# Patient Record
Sex: FEMALE | Race: WHITE | NOT HISPANIC OR LATINO | Employment: FULL TIME | URBAN - METROPOLITAN AREA
[De-identification: names, ages, dates, MRNs, and addresses within clinical notes are randomized per-mention and may not be internally consistent; named-entity substitution may affect disease eponyms.]

---

## 2023-12-16 ENCOUNTER — HOSPITAL ENCOUNTER (EMERGENCY)
Facility: HOSPITAL | Age: 33
Discharge: HOME | End: 2023-12-16
Attending: EMERGENCY MEDICINE

## 2023-12-16 VITALS
TEMPERATURE: 98 F | BODY MASS INDEX: 31.39 KG/M2 | HEIGHT: 67 IN | RESPIRATION RATE: 18 BRPM | DIASTOLIC BLOOD PRESSURE: 103 MMHG | HEART RATE: 100 BPM | OXYGEN SATURATION: 99 % | WEIGHT: 200 LBS | SYSTOLIC BLOOD PRESSURE: 130 MMHG

## 2023-12-16 DIAGNOSIS — S62.614A DISPLACED FRACTURE OF PROXIMAL PHALANX OF RIGHT RING FINGER, INITIAL ENCOUNTER FOR CLOSED FRACTURE: Primary | ICD-10-CM

## 2023-12-16 PROCEDURE — 73140 XR FINGER 2 OR MORE VIEWS RIGHT: ICD-10-PCS | Mod: 26,RT,, | Performed by: RADIOLOGY

## 2023-12-16 PROCEDURE — 99284 EMERGENCY DEPT VISIT MOD MDM: CPT

## 2023-12-16 PROCEDURE — 63600175 PHARM REV CODE 636 W HCPCS: Performed by: NURSE PRACTITIONER

## 2023-12-16 PROCEDURE — 96372 THER/PROPH/DIAG INJ SC/IM: CPT | Performed by: NURSE PRACTITIONER

## 2023-12-16 PROCEDURE — 26742 TREAT FINGER FRACTURE EACH: CPT | Mod: F8

## 2023-12-16 PROCEDURE — 73140 X-RAY EXAM OF FINGER(S): CPT | Mod: TC,RT

## 2023-12-16 PROCEDURE — 73140 X-RAY EXAM OF FINGER(S): CPT | Mod: 26,RT,, | Performed by: RADIOLOGY

## 2023-12-16 PROCEDURE — 25000003 PHARM REV CODE 250: Performed by: NURSE PRACTITIONER

## 2023-12-16 RX ORDER — KETOROLAC TROMETHAMINE 30 MG/ML
60 INJECTION, SOLUTION INTRAMUSCULAR; INTRAVENOUS
Status: COMPLETED | OUTPATIENT
Start: 2023-12-16 | End: 2023-12-16

## 2023-12-16 RX ORDER — IBUPROFEN 800 MG/1
800 TABLET ORAL EVERY 6 HOURS PRN
Qty: 30 TABLET | Refills: 0 | Status: SHIPPED | OUTPATIENT
Start: 2023-12-16

## 2023-12-16 RX ORDER — LIDOCAINE HYDROCHLORIDE 10 MG/ML
5 INJECTION, SOLUTION EPIDURAL; INFILTRATION; INTRACAUDAL; PERINEURAL
Status: COMPLETED | OUTPATIENT
Start: 2023-12-16 | End: 2023-12-16

## 2023-12-16 RX ADMIN — LIDOCAINE HYDROCHLORIDE 80 MG: 10 INJECTION, SOLUTION EPIDURAL; INFILTRATION; INTRACAUDAL; PERINEURAL at 10:12

## 2023-12-16 RX ADMIN — KETOROLAC TROMETHAMINE 60 MG: 30 INJECTION, SOLUTION INTRAMUSCULAR; INTRAVENOUS at 12:12

## 2023-12-16 NOTE — ED NOTES
Ring removed off 4th distal finger via [employer] medical scissors. Pt tolerated well. Awaiting Xray.

## 2023-12-16 NOTE — ED PROVIDER NOTES
"CHIEF COMPLAINT  Chief Complaint   Patient presents with    Rt hand injury      Patient reports falling approx 30 min PTA  . 4th digit  deformity        HISTORY OF PRESENT ILLNESS  Fallon Berumen is a 33 y.o. female presenting with right ring finger dislocation 30 minutes PTA for falling. Patient was wearing a ring. Neurovascular intact.  No other specific aggravating or relieving factors otherwise.      PAST MEDICAL HISTORY  No past medical history on file.    CURRENT MEDICATIONS    No current facility-administered medications for this encounter.    Current Outpatient Medications:     ibuprofen (ADVIL,MOTRIN) 800 MG tablet, Take 1 tablet (800 mg total) by mouth every 6 (six) hours as needed for Pain. With meals, Disp: 30 tablet, Rfl: 0    ALLERGIES    Review of patient's allergies indicates:  No Known Allergies    SURGICAL HISTORY    No past surgical history on file.    SOCIAL HISTORY    Social History     Socioeconomic History    Marital status: Single       FAMILY HISTORY    No family history on file.    REVIEW OF SYSTEMS    Review of Systems   Musculoskeletal:  Positive for joint pain.   All other systems reviewed and are negative.    All other systems reviewed and are negative    VITAL SIGNS:   BP (!) 130/103 (BP Location: Left arm, Patient Position: Sitting)   Pulse 100   Temp 98 °F (36.7 °C) (Oral)   Resp 18   Ht 5' 7" (1.702 m)   Wt 90.7 kg (200 lb)   LMP 12/13/2023   SpO2 99%   BMI 31.32 kg/m²      Physical Exam  Constitutional:       Appearance: Normal appearance.   HENT:      Head: Normocephalic.   Cardiovascular:      Rate and Rhythm: Normal rate.   Pulmonary:      Effort: Pulmonary effort is normal. No respiratory distress.      Breath sounds: Normal breath sounds.   Abdominal:      Palpations: Abdomen is soft.   Musculoskeletal:         General: Normal range of motion.      Right hand: Deformity (right ring finger) present. Normal capillary refill. Normal pulse.      Left hand: " Normal capillary refill. Normal pulse.   Skin:     General: Skin is warm.      Capillary Refill: Capillary refill takes less than 2 seconds.   Neurological:      General: No focal deficit present.      Mental Status: She is alert.      GCS: GCS eye subscore is 4. GCS verbal subscore is 5. GCS motor subscore is 6.   Psychiatric:         Attention and Perception: Attention normal.         Mood and Affect: Mood normal.         Speech: Speech normal.       Vitals and nursing note reviewed.     LABS    Labs Reviewed - No data to display      EKG    No results found for this or any previous visit.      RADIOLOGY    X-Ray Finger 2 or More Views Right   Final Result      As above         Electronically signed by: Jose Sykes MD   Date:    12/16/2023   Time:    11:46      X-Ray Finger 2 or More Views Right   Final Result      Acute fracture-dislocation at the right 4th PIP joint.         Electronically signed by: Jose Sykes MD   Date:    12/16/2023   Time:    11:48            PROCEDURES    Orthopedic Injury    Date/Time: 12/16/2023 11:39 AM    Performed by: Mary Ritter NP  Authorized by: Cory Cain MD    Location procedure was performed:  [facility] EMERGENCY DEPARTMENT  Pre-operative diagnosis:  Right ring finger dislocation  Consent Done?:  Yes  Universal Protocol:     Verbal consent obtained?: Yes      Risks and benefits: Risks, benefits and alternatives were discussed      Consent given by:  Patient    Patient states understanding of procedure being performed: Yes    Injury:     Injury location:  Finger    Location details:  Right ring finger      Pre-procedure assessment:     Neurovascular status: Neurovascularly intact      Distal perfusion: normal      Neurological function: normal      Range of motion: reduced      Local anesthesia used?: Yes      Anesthesia:  Digital block    Local anesthetic:  Lidocaine 1% without epinephrine    Anesthetic total (ml):  8    Patient sedated?: No        Procedure details:      Description of findings:  Reduction of dislocation  Selections made in this section will also lock the Injury type section above.:     Immobilization:  Splint    Splint type:  Static finger    Supplies used:  Aluminum splint    Complications: No    Post-procedure assessment:     Neurovascular status: Neurovascularly intact      Distal perfusion: normal      Neurological function: normal      Range of motion: improved      Patient tolerance:  Patient tolerated the procedure well with no immediate complications     Digital block given, Pt's ring was cut, reduction performed with complication.  Neurovascular intact, full range of motion performed.  Finger splint applied       Medications   LIDOcaine (PF) 10 mg/ml (1%) injection 50 mg (80 mg Infiltration Given 12/16/23 1050)   ketorolac injection 60 mg (60 mg Intramuscular Given 12/16/23 1224)                Medical Decision Making  Fallon Berumen is a 33 y.o. female presenting with right ring finger dislocation 30 minutes PTA for falling.  Neurovascular intact.  No other specific aggravating or relieving factors otherwise.    DDX:Fracture with dislocation, strain, sprain, tendonitis   Xray findings :  There is dorsal and ulnar dislocation of the 4th proximal phalanx relative to the proximal phalanx.  There are multiple small fracture fragments visible along the palmar aspect of the 4th proximal phalangeal head.  There is regional soft tissue swelling about the 4th PIP joint.     Impression:   Acute fracture-dislocation at the right 4th PIP joint.    Discussed discharge instructions with patient and return precautions. Check Distally NVI per routine. Patient is well-appearing, in no apparent distress, and vital signs stable for discharge home. Return precautions for fracture and return for repeat imaging if needed discussed.  Disposition: Discharge with strict return precautions and instructions to follow up with primary MD within 24-48 hours for further  evaluation including referral to an orthopedist.      Problems Addressed:  Displaced fracture of proximal phalanx of right ring finger, initial encounter for closed fracture: acute illness or injury    Amount and/or Complexity of Data Reviewed  Radiology: ordered. Decision-making details documented in ED Course.    Risk  Prescription drug management.  Minor surgery with identified risk factors.           Discharge Medication List as of 12/16/2023 12:17 PM          Discharge Medication List as of 12/16/2023 12:17 PM        START taking these medications    Details   ibuprofen (ADVIL,MOTRIN) 800 MG tablet Take 1 tablet (800 mg total) by mouth every 6 (six) hours as needed for Pain. With meals, Starting Sat 12/16/2023, Normal               DISPOSITION  Patient discharged to home in stable condition.        FINAL IMPRESSION    1. Displaced fracture of proximal phalanx of right ring finger, initial encounter for closed fracture         Mary Ritter, BECKI  12/16/23 [id number]

## 2023-12-16 NOTE — DISCHARGE INSTRUCTIONS
You have been evaluated in the Emergency Department today for right ring finger pain. Your evaluation showed a fracture of your ring finger. We have reduced dislocation, placed your ring finger in a splint today.  Please rest, ice, and elevate your right hand to help it heal.   We recommend you take 600mg ibuprofen every 6 hours or tylenol 650mg every 6 hours as needed for pain.  Please follow-up with an orthopedic surgeon within 1 week.  Return to the Emergency Department if you experience worsening pain, numbness, tingling, change of color in your finger, or any other concerning symptoms.  Thank you for choosing us for your care.